# Patient Record
Sex: MALE | Race: WHITE | NOT HISPANIC OR LATINO | URBAN - METROPOLITAN AREA
[De-identification: names, ages, dates, MRNs, and addresses within clinical notes are randomized per-mention and may not be internally consistent; named-entity substitution may affect disease eponyms.]

---

## 2023-01-01 ENCOUNTER — PATIENT OUTREACH (OUTPATIENT)
Dept: CARE COORDINATION | Facility: CLINIC | Age: 83
End: 2023-01-01
Payer: MEDICARE

## 2023-01-01 ENCOUNTER — APPOINTMENT (OUTPATIENT)
Dept: CARDIOLOGY | Facility: CLINIC | Age: 83
End: 2023-01-01
Payer: MEDICARE

## 2023-01-01 ENCOUNTER — APPOINTMENT (OUTPATIENT)
Dept: PRIMARY CARE | Facility: CLINIC | Age: 83
End: 2023-01-01
Payer: MEDICARE

## 2023-01-01 DIAGNOSIS — E11.9 TYPE 2 DIABETES MELLITUS WITHOUT COMPLICATION, WITHOUT LONG-TERM CURRENT USE OF INSULIN (MULTI): ICD-10-CM

## 2023-01-01 DIAGNOSIS — I21.29: Primary | ICD-10-CM

## 2023-01-01 DIAGNOSIS — E78.00 HIGH CHOLESTEROL: Primary | ICD-10-CM

## 2023-01-01 RX ORDER — ALBUTEROL SULFATE 90 UG/1
2 AEROSOL, METERED RESPIRATORY (INHALATION)
COMMUNITY

## 2023-01-01 RX ORDER — AMLODIPINE BESYLATE 5 MG/1
5 TABLET ORAL DAILY
COMMUNITY

## 2023-01-01 RX ORDER — NAPROXEN SODIUM 220 MG/1
1 TABLET, FILM COATED ORAL DAILY
COMMUNITY
Start: 2021-11-13

## 2023-01-01 RX ORDER — UMECLIDINIUM BROMIDE AND VILANTEROL TRIFENATATE 62.5; 25 UG/1; UG/1
1 POWDER RESPIRATORY (INHALATION) DAILY
COMMUNITY

## 2023-01-01 RX ORDER — AMLODIPINE BESYLATE 10 MG/1
10 TABLET ORAL EVERY 24 HOURS
COMMUNITY
Start: 2021-11-13

## 2023-01-01 RX ORDER — AMOXICILLIN 250 MG
2 CAPSULE ORAL 2 TIMES DAILY
COMMUNITY
Start: 2021-11-13

## 2023-01-01 RX ORDER — BACITRACIN 500 [USP'U]/G
1 OINTMENT TOPICAL 2 TIMES DAILY
COMMUNITY
Start: 2021-11-13

## 2023-01-01 RX ORDER — CARVEDILOL 25 MG/1
25 TABLET ORAL EVERY 12 HOURS
COMMUNITY
Start: 2021-12-13 | End: 2023-01-01

## 2023-01-01 RX ORDER — METFORMIN HYDROCHLORIDE 500 MG/1
1000 TABLET, EXTENDED RELEASE ORAL
Qty: 360 TABLET | Refills: 3 | Status: SHIPPED | OUTPATIENT
Start: 2023-01-01 | End: 2024-04-15

## 2023-01-01 RX ORDER — DORZOLAMIDE HCL 20 MG/ML
1 SOLUTION/ DROPS OPHTHALMIC 2 TIMES DAILY
COMMUNITY
Start: 2023-01-01

## 2023-01-01 RX ORDER — LOSARTAN POTASSIUM 50 MG/1
50 TABLET ORAL EVERY 24 HOURS
COMMUNITY
End: 2024-01-01 | Stop reason: ALTCHOICE

## 2023-01-01 RX ORDER — LOTEPREDNOL ETABONATE 5 MG/G
1 GEL OPHTHALMIC
COMMUNITY
Start: 2022-11-10

## 2023-01-01 RX ORDER — LOSARTAN POTASSIUM 25 MG/1
25 TABLET ORAL DAILY
COMMUNITY
End: 2024-01-01 | Stop reason: ALTCHOICE

## 2023-01-01 RX ORDER — ATORVASTATIN CALCIUM 40 MG/1
40 TABLET, FILM COATED ORAL DAILY
COMMUNITY
End: 2023-01-01 | Stop reason: SDUPTHER

## 2023-01-01 RX ORDER — NITROGLYCERIN 0.4 MG/1
0.4 TABLET SUBLINGUAL
COMMUNITY

## 2023-01-01 RX ORDER — CARVEDILOL 25 MG/1
25 TABLET ORAL 2 TIMES DAILY
Qty: 180 TABLET | Refills: 0 | Status: SHIPPED | OUTPATIENT
Start: 2023-01-01 | End: 2024-01-01

## 2023-01-01 RX ORDER — SITAGLIPTIN 100 MG/1
100 TABLET, FILM COATED ORAL EVERY 24 HOURS
COMMUNITY
Start: 2021-05-03 | End: 2023-01-01

## 2023-01-01 RX ORDER — CEFUROXIME AXETIL 500 MG/1
500 TABLET ORAL
COMMUNITY

## 2023-01-01 RX ORDER — METFORMIN HYDROCHLORIDE 500 MG/1
500 TABLET, EXTENDED RELEASE ORAL EVERY 24 HOURS
COMMUNITY
Start: 2016-06-08 | End: 2023-01-01

## 2023-01-01 RX ORDER — PREDNISONE 20 MG/1
20 TABLET ORAL
COMMUNITY
Start: 2023-01-01

## 2023-01-01 RX ORDER — FLUTICASONE PROPIONATE AND SALMETEROL 250; 50 UG/1; UG/1
1 POWDER RESPIRATORY (INHALATION) 2 TIMES DAILY
COMMUNITY
Start: 2023-01-01

## 2023-01-01 RX ORDER — ATORVASTATIN CALCIUM 40 MG/1
40 TABLET, FILM COATED ORAL DAILY
Qty: 90 TABLET | Refills: 3 | Status: SHIPPED | OUTPATIENT
Start: 2023-01-01 | End: 2024-04-15

## 2023-01-01 RX ORDER — BRIMONIDINE TARTRATE 2 MG/ML
1 SOLUTION/ DROPS OPHTHALMIC 2 TIMES DAILY
COMMUNITY

## 2023-01-01 RX ORDER — FUROSEMIDE 20 MG/1
20 TABLET ORAL EVERY 12 HOURS
COMMUNITY
Start: 2017-10-13

## 2023-08-23 NOTE — PROGRESS NOTES
Outreach call to patient to support a smooth transition of care from recent admission.  Spoke with patient, reviewed discharge medications, discharge instructions, assessed social needs, and provided education on importance of follow-up appointment with provider.  Will continue to monitor through transition period.    NAI Cordova, RN     Engagement  Call Start Time: 1250 (8/23/2023  1:02 PM)    Medications  Medications reviewed with patient/caregiver?: Yes (8/23/2023  1:02 PM)  Is the patient having any side effects they believe may be caused by any medication additions or changes?: No (8/23/2023  1:02 PM)  Does the patient have all medications ordered at discharge?: Yes (8/23/2023  1:02 PM)  Care Management Interventions: No intervention needed (8/23/2023  1:02 PM)  Is the patient taking all medications as directed (includes completed medication regime)?: Yes (8/23/2023  1:02 PM)    Appointments  Does the patient have a primary care provider?: Yes (8/23/2023  1:02 PM)  Care Management Interventions: Verified appointment date/time/provider (8/23/2023  1:02 PM)  Has the patient kept scheduled appointments due by today?: Yes (8/23/2023  1:02 PM)    Self Management  What is the home health agency?: None (8/23/2023  1:02 PM)  What Durable Medical Equipment (DME) was ordered?: None (8/23/2023  1:02 PM)    Patient Teaching  Does the patient have access to their discharge instructions?: Yes (8/23/2023  1:02 PM)  Care Management Interventions: Reviewed instructions with patient (8/23/2023  1:02 PM)  What is the patient's perception of their health status since discharge?: Improving (8/23/2023  1:02 PM)  Is the patient/caregiver able to teach back the hierarchy of who to call/visit for symptoms/problems? PCP, Specialist, Home Health nurse, Urgent Care, ED, 911: Yes (8/23/2023  1:02 PM)    Wrap Up  Wrap Up Additional Comments: Outreach and chart review completed. CM spoke with patient and he states he is feeling good,  patient states he is still on 4L of O2 via [place] his baseline with no SOB, patient has new prescription of predinisone took first dose today, patient states he is able to complete his ADLs prepare his meal and work on his computer with no issues. Reviewed DC summary and medications, patient states he has an upcoming appointment with his PCP.  No questions or concerns noted. LEOBARDO CordovaN, RN. (8/23/2023  1:02 PM)  Call End Time: 1258 (8/23/2023  1:02 PM)

## 2023-09-12 PROBLEM — I71.40 ABDOMINAL AORTIC ANEURYSM WITHOUT RUPTURE (CMS-HCC): Status: ACTIVE | Noted: 2023-01-01

## 2023-09-12 PROBLEM — I45.10 RIGHT BUNDLE BRANCH BLOCK: Status: ACTIVE | Noted: 2023-01-01

## 2023-09-12 PROBLEM — I21.9 ACUTE MYOCARDIAL INFARCTION (MULTI): Status: ACTIVE | Noted: 2023-01-01

## 2023-09-12 PROBLEM — I73.9 PERIPHERAL VASCULAR DISEASE (CMS-HCC): Status: ACTIVE | Noted: 2023-01-01

## 2023-09-12 PROBLEM — I20.9 ANGINA PECTORIS (CMS-HCC): Status: ACTIVE | Noted: 2023-01-01

## 2023-09-12 PROBLEM — I05.9 MITRAL VALVE DISORDER: Status: ACTIVE | Noted: 2023-01-01

## 2023-09-12 PROBLEM — R06.00 DYSPNEA: Status: ACTIVE | Noted: 2023-01-01

## 2023-09-12 PROBLEM — A41.9 SEPSIS (MULTI): Status: ACTIVE | Noted: 2023-01-01

## 2023-09-12 PROBLEM — I25.10 DISORDER OF CARDIOVASCULAR SYSTEM: Status: ACTIVE | Noted: 2023-01-01

## 2023-09-12 PROBLEM — R91.1 LUNG NODULE: Status: ACTIVE | Noted: 2023-01-01

## 2023-09-12 PROBLEM — F41.9 ANXIETY: Status: ACTIVE | Noted: 2023-01-01

## 2023-09-12 PROBLEM — Z95.5 HISTORY OF CORONARY ARTERY STENT PLACEMENT: Status: ACTIVE | Noted: 2023-01-01

## 2023-09-12 PROBLEM — I10 ESSENTIAL HYPERTENSION: Status: ACTIVE | Noted: 2023-01-01

## 2023-09-12 PROBLEM — I36.9 NONRHEUMATIC TRICUSPID VALVE DISORDER: Status: ACTIVE | Noted: 2023-01-01

## 2023-09-12 PROBLEM — J44.9 CHRONIC OBSTRUCTIVE LUNG DISEASE (MULTI): Status: ACTIVE | Noted: 2023-01-01

## 2023-09-12 PROBLEM — N19 RENAL FAILURE: Status: ACTIVE | Noted: 2023-01-01

## 2023-09-12 PROBLEM — R07.9 CHEST PAIN: Status: ACTIVE | Noted: 2023-01-01

## 2023-09-12 PROBLEM — I25.10 ARTERIOSCLEROSIS OF CORONARY ARTERY: Status: ACTIVE | Noted: 2023-01-01

## 2023-09-12 PROBLEM — I71.60 THORACOABDOMINAL ANEURYSM (CMS-HCC): Status: ACTIVE | Noted: 2023-01-01

## 2023-09-12 PROBLEM — I71.9 AORTIC ANEURYSM (CMS-HCC): Status: ACTIVE | Noted: 2023-01-01

## 2023-09-12 PROBLEM — E11.9 DIABETES MELLITUS (MULTI): Status: ACTIVE | Noted: 2023-01-01

## 2023-09-12 PROBLEM — R10.9 FLANK PAIN: Status: ACTIVE | Noted: 2023-01-01

## 2023-09-12 PROBLEM — E78.00 HIGH CHOLESTEROL: Status: ACTIVE | Noted: 2023-01-01

## 2023-09-12 PROBLEM — E11.9 TYPE 2 DIABETES MELLITUS WITHOUT COMPLICATION (MULTI): Status: ACTIVE | Noted: 2023-01-01

## 2023-09-12 PROBLEM — I71.20 THORACIC AORTIC ANEURYSM WITHOUT RUPTURE (CMS-HCC): Status: ACTIVE | Noted: 2023-01-01

## 2023-09-12 PROBLEM — R93.89 ABNORMAL COMPUTERIZED AXIAL TOMOGRAPHY OF CHEST: Status: ACTIVE | Noted: 2023-01-01

## 2023-09-12 PROBLEM — N20.0 KIDNEY STONE: Status: ACTIVE | Noted: 2023-01-01

## 2023-09-12 PROBLEM — J18.9 RIGHT LOWER LOBE PNEUMONIA: Status: ACTIVE | Noted: 2023-01-01

## 2023-09-12 PROBLEM — J96.00 ACUTE RESPIRATORY FAILURE (MULTI): Status: ACTIVE | Noted: 2023-01-01

## 2023-09-12 PROBLEM — R73.09 BLOOD GLUCOSE ABNORMAL: Status: ACTIVE | Noted: 2023-01-01

## 2023-09-12 PROBLEM — N13.4 HYDROURETER: Status: ACTIVE | Noted: 2023-01-01

## 2024-01-01 ENCOUNTER — APPOINTMENT (OUTPATIENT)
Dept: CARDIOLOGY | Facility: CLINIC | Age: 84
End: 2024-01-01
Payer: MEDICARE

## 2024-01-01 DIAGNOSIS — I21.29: ICD-10-CM

## 2024-01-01 DIAGNOSIS — I10 ESSENTIAL HYPERTENSION: Primary | ICD-10-CM

## 2024-01-01 RX ORDER — CARVEDILOL 25 MG/1
25 TABLET ORAL 2 TIMES DAILY
Qty: 180 TABLET | Refills: 3 | Status: SHIPPED | OUTPATIENT
Start: 2024-01-01 | End: 2024-04-15

## 2024-01-01 RX ORDER — LOSARTAN POTASSIUM 25 MG/1
25 TABLET ORAL DAILY
Qty: 90 TABLET | Refills: 3 | Status: SHIPPED | OUTPATIENT
Start: 2024-01-01 | End: 2024-01-01 | Stop reason: SDUPTHER

## 2024-01-01 RX ORDER — LOSARTAN POTASSIUM 25 MG/1
25 TABLET ORAL DAILY
Qty: 90 TABLET | Refills: 0 | Status: SHIPPED | OUTPATIENT
Start: 2024-01-01 | End: 2024-04-15

## 2024-05-16 RX ORDER — FUROSEMIDE 20 MG/1
20 TABLET ORAL 2 TIMES DAILY
Qty: 180 TABLET | Refills: 0 | OUTPATIENT
Start: 2024-05-16